# Patient Record
Sex: MALE | Race: BLACK OR AFRICAN AMERICAN
[De-identification: names, ages, dates, MRNs, and addresses within clinical notes are randomized per-mention and may not be internally consistent; named-entity substitution may affect disease eponyms.]

---

## 2017-07-28 NOTE — EMERGENCY ROOM REPORT
History of Present Illness


General


Chief Complaint:  Behavioral Complaint


Source:  Patient, Family Member, Caregiver





Present Illness


HPI


The patient is a 22-year-old male with history of depression and autism 

presenting for feelings of depression and thoughts of self-harm.  He is 

accompanied by his father.  The patient states that he would like to go to a 

psychiatric facility for help.  He states that he had thoughts of taking a 

knife to his neck.  He states he has regular followups with the psychiatrist 

monthly and he has been taking his medications as directed.


He denies taking any action to hurt himself.


He denies any other symptoms


Allergies:  


Coded Allergies:  


     AMOXICILLIN (Verified  Adverse Reaction, Intermediate, 12/7/16)


 Patient had whole body erythematous plaques, confluented in


 places. No airway or mucosal involvement.





Patient History


Past Medical History:  see triage record, psych hx


Pertinent Family History:  none


Reviewed Nursing Documentation:  PMH: Agreed, PSxH: Agreed





Nursing Documentation-PMH


Past Medical History:  No History, Except For


Hx Cardiac Problems:  No - AUTISM





Review of Systems


All Other Systems:  negative except mentioned in HPI





Physical Exam





Vital Signs








  Date Time  Temp Pulse Resp B/P Pulse Ox O2 Delivery O2 Flow Rate FiO2


 


7/28/17 15:18 97.9 80 14 138/74 98 Room Air  








Sp02 EP Interpretation:  reviewed, normal


General Appearance:  no apparent distress, alert, GCS 15, non-toxic


Head:  normocephalic, atraumatic


Eyes:  bilateral eye PERRL, bilateral eye normal inspection


ENT:  hearing grossly normal, normal pharynx, no angioedema, normal voice


Neck:  full range of motion, supple/symm/no masses, other - No lacerations or 

abrasions


Respiratory:  chest non-tender, lungs clear, normal breath sounds, speaking 

full sentences


Musculoskeletal:  back normal, gait/station normal, normal range of motion, non-

tender


Neurologic:  alert, oriented x3, responsive, sensory intact


Psychiatric:  normal inspection


Skin:  normal color, no rash, warm/dry, well hydrated





Medical Decision Making


PA Attestation


Dr. Pinedo is my supervising physician. Patient management was discussed with 

my supervising physician


Diagnostic Impression:  


 Primary Impression:  


 Depression


 Qualified Codes:  F32.9 - Major depressive disorder, single episode, 

unspecified


ER Course


The patient is a 22-year-old male with history of depression and autism 

presenting for feelings of depression and thoughts of self-harm





Differential diagnoses considered but not limited to suicidal ideation, 

homicidal ideation, depression, drug abuse





Physical exam: The patient is in no apparent distress.


There are no markings on the patient's neck.





I discussed with the patient that we are not a psychiatric facility but we 

would be able to medically clear him and possibly transfer him to one.





The father and the patient have decided to directly go to a psychiatric 

facility.





They were given ER precautions.





Last Vital Signs








  Date Time  Temp Pulse Resp B/P Pulse Ox O2 Delivery O2 Flow Rate FiO2


 


7/28/17 16:09 97.9 65 14 138/74 98 Room Air  








Status:  improved


Disposition:  HOME, SELF-CARE


Condition:  Stable


Referrals:  


NON PHYSICIAN (PCP)


Patient Instructions:  Depression, Adult





Additional Instructions:  


I discussed my findings with the patient and his father. All questions and 

concerns have been answered. 


The father and the patient have agreed to seek care at a psychiatric facility.


They have chosen to not stay and obtain medical clearance in the emergency 

department











JOSELINE RIVERA Jul 28, 2017 19:57

## 2017-08-29 NOTE — EMERGENCY ROOM REPORT
History of Present Illness


General


Chief Complaint:  General Complaint


Source:  Patient, Medical Record





Present Illness


HPI


This is a 22-year-old male with a history of his him.  He also has psychiatric 

history.  He snuck out of the house and came in not complaining that for 

himself but saying that he is concerned by his father's asthma.  He does not 

want us to call his father.  Patient denies suicidal thought homicidal thought.

  Denies any other complaint.  No hallucination.


Allergies:  


Coded Allergies:  


     AMOXICILLIN (Verified  Adverse Reaction, Intermediate, 12/7/16)


 Patient had whole body erythematous plaques, confluented in


 places. No airway or mucosal involvement.





Patient History


Past Medical History:  see triage record, old chart reviewed


Past Surgical History:  none


Pertinent Family History:  none


Social History:  Denies: smoking


Immunizations:  other


Reviewed Nursing Documentation:  PMH: Agreed, PSxH: Agreed





Nursing Documentation-PMH


Hx Cardiac Problems:  No - AUTISM





Review of Systems


Eye:  Denies: eye pain, blurred vision


ENT:  Denies: ear pain, nose congestion, throat swelling


Respiratory:  Denies: cough, shortness of breath


Cardiovascular:  Denies: chest pain, palpitations


Gastrointestinal:  Denies: abdominal pain, diarrhea, nausea, vomiting


Musculoskeletal:  Denies: back pain, joint pain


Skin:  Denies: rash


Neurological:  Denies: headache, numbness


Endocrine:  Denies: increased thirst, increased urine


Hematologic/Lymphatic:  Denies: easy bruising


All Other Systems:  negative except mentioned in HPI





Physical Exam





Vital Signs








  Date Time  Temp Pulse Resp B/P (MAP) Pulse Ox O2 Delivery O2 Flow Rate FiO2


 


8/29/17 02:45 98.1 85 13 160/84 96 Room Air  





vitals unremarkable


Sp02 EP Interpretation:  reviewed, normal


General Appearance:  well appearing, no apparent distress, alert


Head:  normocephalic, atraumatic


Eyes:  bilateral eye PERRL, bilateral eye EOMI


ENT:  hearing grossly normal, normal pharynx


Neck:  full range of motion, supple, no meningismus


Respiratory:  chest non-tender, lungs clear, normal breath sounds


Cardiovascular #1:  regular rate, rhythm, no murmur


Gastrointestinal:  normal bowel sounds, non tender, no mass, no organomegaly, 

no bruit, non-distended


Musculoskeletal:  back normal, gait/station normal, normal range of motion


Psychiatric:  mood/affect normal


Skin:  warm/dry





Medical Decision Making


Diagnostic Impression:  


 Primary Impression:  


 Behavior problem


ER Course


Patient presents with behavioral disturbance.  Probably from his autism and 

psychiatric issue.  He is at baseline.  His father's call and will be coming to 

pick patient up.  See no criteria for 5150.





Last Vital Signs








  Date Time  Temp Pulse Resp B/P (MAP) Pulse Ox O2 Delivery O2 Flow Rate FiO2


 


8/29/17 02:45 98.1 85 13 160/84 96 Room Air  








Status:  improved


Disposition:  HOME, SELF-CARE


Condition:  Stable





Additional Instructions:  


followup with your Dr. in 7 days.  Return it worse.











JUN LARSON M.D. Aug 29, 2017 03:19

## 2017-10-23 NOTE — EMERGENCY ROOM REPORT
History of Present Illness


General


Chief Complaint:  Behavioral Complaint


Source:  Patient





Present Illness


HPI


22-year-old male with history of autism and depression, presenting with 

depression.  Patient states that he has been compliant with his antidepression 

medication including Abilify, has an appointment to see a psychiatrist in 4 

days.  Patient states that he is poor at home and wants to get involved with 

the community so that he can get his thoughts away from his depression.  

Denying any current suicidal or homicidal thoughts.  Denying any auditory 

hallucinations.


Allergies:  


Coded Allergies:  


     AMOXICILLIN (Verified  Adverse Reaction, Intermediate, 12/7/16)


 Patient had whole body erythematous plaques, confluented in


 places. No airway or mucosal involvement.





Patient History


Past Medical History:  see triage record


Past Surgical History:  none


Pertinent Family History:  none


Reviewed Nursing Documentation:  PMH: Agreed, PSxH: Agreed





Nursing Documentation-PMH


Hx Cardiac Problems:  No - AUTISM


History Of Psychiatric Problem:  Yes - "Autism"; bipolar, depression





Review of Systems


All Other Systems:  negative except mentioned in HPI





Physical Exam





Vital Signs








  Date Time  Temp Pulse Resp B/P (MAP) Pulse Ox O2 Delivery O2 Flow Rate FiO2


 


10/23/17 07:50 97.5 94 16 126/77 95 Room Air  








Sp02 EP Interpretation:  reviewed, normal


General Appearance:  normal inspection, well appearing, no apparent distress, 

alert, GCS 15, non-toxic


Head:  normocephalic, atraumatic


Eyes:  bilateral eye normal inspection, bilateral eye PERRL, bilateral eye EOMI


ENT:  normal ENT inspection, normal pharynx, normal voice, moist mucus membranes


Neck:  normal inspection, full range of motion, supple


Respiratory:  normal inspection, lungs clear, normal breath sounds, no 

respiratory distress, no retraction, no wheezing, speaking full sentences, 

chest symmetrical


Cardiovascular #1:  normal inspection, regular rate, rhythm, no edema, normal 

capillary refill


Cardiovascular #2:  2+ radial (R), 2+ radial (L)


Gastrointestinal:  normal inspection, non tender, soft, non-distended, no 

guarding


Genitourinary:  no CVA tenderness


Musculoskeletal:  normal inspection, back normal, normal range of motion, non-

tender


Neurologic:  normal inspection, alert, oriented x3, responsive, motor strength/

tone normal, sensory intact, normal gait, speech normal


Psychiatric:  normal inspection, judgement/insight normal, memory normal, mood/

affect normal, no suicidal/homicidal ideation, no delusions


Skin:  normal inspection, normal color, no rash, warm/dry, well hydrated, 

normal turgor





Medical Decision Making


Diagnostic Impression:  


 Primary Impression:  


 Depression


ER Course


22-year-old male with depression


No active SI or HI





Plan:


None in the emergency room





ER course:


Patient has remained stable during ED stay.


Patient was happy affect, very conversive, showing myself and staff his 

projects that he has been working on, ambulatory emergency room, again no SI or 

HI





Disposition:


Patient is to be discharged to home.


Patient is instructed to follow up with their psychiatrist  within 5 days. 





Strict return precautions discussed with patient such as suicidal or homicidal 

thoughts, auditory hallucinations. 





Please note that this Emergency Department Report was dictated using [employer] technology software, occasionally this can lead to 

erroneous entry secondary to interpretation by the dictation equipment





Last Vital Signs








  Date Time  Temp Pulse Resp B/P (MAP) Pulse Ox O2 Delivery O2 Flow Rate FiO2


 


10/23/17 08:35  78 16 118/78 100 Room Air  


 


10/23/17 08:35 97.5       








Disposition:  HOME, SELF-CARE


Condition:  Stable


Referrals:  


NOT CHOSEN [facility],REFERRING (PCP)


Patient Instructions:  Depression, Adult, Easy-to-Read





Additional Instructions:  


Please followup with your psychiatrist in 4 days without fail





Please return to the emergency room if you are experiencing suicidal thoughts, 

hearing voices,











Keyonna Godoy M.D. Oct 23, 2017 10:02

## 2017-12-18 NOTE — EMERGENCY ROOM REPORT
History of Present Illness


General


Chief Complaint:  General Complaint


Source:  Patient, Medical Record





Present Illness


HPI


22-year-old male presents to the emergency department complaining of 

exacerbation of his depression x4 months.  Patient presents with his guardian 

he states that he has been taking Lexapro for his symptoms of depression and 

intermittent anxiety.  Patient states that over the last few weeks he has had 

increase in anhedonia, appetite, overwhelming feeling of sadness he denies SI 

or HI.  Patient reports difficulty concentrating and increased anxiety and 

social situations.  Father tested the states patient has become increasingly 

withdrawn, especially when attempting to verbalize his feelings and is having 

exacerbations of his autism.  Father states that patient was high functioning 

autistic to hold a job and finish schooling.  He noticed that his symptoms 

however increased after his friends moved away.  Denies fevers, chills, cough, 

recent trauma.  he denies history of TBI.  Denies flashbacks or witnessing her 

segments.  he states that he self DC'd his Lexapro 2 weeks ago, ordered some 

mild headaches after abrupt discontinuance.  Patient has been taking this 

medication since childhood.


Allergies:  


Coded Allergies:  


     AMOXICILLIN (Verified  Adverse Reaction, Intermediate, 12/7/16)


 Patient had whole body erythematous plaques, confluented in


 places. No airway or mucosal involvement.





Patient History


Past Medical History:  see triage record


Past Surgical History:  none


Pertinent Family History:  none


Reviewed Nursing Documentation:  PMH: Agreed, PSxH: Agreed





Nursing Documentation-PMH


Past Medical History:  No History, Except For


Hx Cardiac Problems:  No - AUTISM





Physical Exam





Vital Signs








  Date Time  Temp Pulse Resp B/P (MAP) Pulse Ox O2 Delivery O2 Flow Rate FiO2


 


12/18/17 12:06 97.5 80 18 149/88 99 Room Air  











Medical Decision Making


PA Attestation


Dr. andres is my supervising Physician whom patient management has been 

discussed with.


Diagnostic Impression:  


 Primary Impression:  


 Depression


 Qualified Codes:  F33.1 - Major depressive disorder, recurrent, moderate


 Additional Impressions:  


 Impaired attention


 Decreased attention Span


ER Course








Pt. presents to the ED c/o  [ ] 





Pt is hyperactive, and has a very  anxious and restless affect.  





Ddx considered but are not limited to OD, SI/HI, psychosis, UTI, intoxication





Vital signs: are WNL, pt. is afebrile


H&PE are most consistent with behavioral/mental health issue





ORDERS:








ED INTERVENTIONS: 


- 





-Telephone Psych Consult: Dr. Nolen


 *** Consulted with  for heart he regarding starting patient on Wellbutrin 

for depression and social withdrawal.  Discussed how this patient is a high 

functioning autistic person with intermittent anxiety however both patient and 

father described hyperactivity and anxiety symptoms may be stemming from an 

adequately managed hyperactivity disorder which is typically associated with 

autistic persons.  Has no history of aggression, aside or PSA's and therefore 

does not have any contraindications for starting this medication. 








Discussed with the patient and his guardian that this medication should be 

titrated on slowly as it may increase his anxiety early and that he will be 

started on the lowest dose he needs to followup with psychiatric professional 

within 3-5 days he is also going to be given next is mental health urgent care 

if he is unable to contact his psychiatrist.  The ED return precautions.   

Discussed with patient that if he develops aggression or as I that he needs to 

immediately discontinue this medication. 





DISPOSITION: Pt stable for close outpatient followup will start trial of 

Wellbutrin SR. 





Pt to follow up with [facility] IF UNABLE TO FOLLOW UP 

WITH HIS NEW PSYCH.  IN A TIMELY FASHION.





Last Vital Signs








  Date Time  Temp Pulse Resp B/P (MAP) Pulse Ox O2 Delivery O2 Flow Rate FiO2


 


12/18/17 12:06 97.5 80 18 149/88 99 Room Air  








Disposition:  HOME, SELF-CARE


Condition:  Stable


Physician Consult:  Dr. Nolen ( telephone consult)


Scripts


Bupropion Hcl* (BUPROPION HCL*) 75 Mg Tablet


75 MG PO BID for Per rx protocol for 15 Days, #30 TAB


   Start by Taking one tablet ONCE daily for 4 days then increase to


   TWICE daily - Follow up with Psych for refills or dosage change.


   Prov: Ping Whitman         12/18/17


Referrals:  


[facility],REFERRING (PCP)


Patient Instructions:  Bupropion tablets (Depression/Mood Disorders)





Additional Instructions:  


Take medications as directed. 





Followup with psychiatrist regarding new medication  Wellbutrin in addition to 

evaluation for your symptoms and further medication management. -- REVIEW 

[facility] RESOURCE INFORMATION FOR FOLLOW UP IF NEEDED. 





*!* Discontinue medication immediately if you experience increased aggression, 

suicidal thoughts or worsening of your symptoms. 





 ** Follow up with a Primary Care Provider in 3-5 days, even if your symptoms 

have resolved. ** - Recommend Thyroid function testing and vitamin D levels if 

this has not been performed recently. 





--Please review list of primary care clinics, if you do not already have a 

primary care provider





Return sooner to ED if new symptoms occur, or current symptoms become worse. 











- Please note that this Emergency Department Report was dictated using [employer] technology software, occasionally this can lead to 

erroneous entry secondary to interpretation by the dictation equipment.











Ping Whitman Dec 18, 2017 12:45

## 2019-12-12 ENCOUNTER — HOSPITAL ENCOUNTER (EMERGENCY)
Dept: HOSPITAL 72 - EMR | Age: 24
Discharge: HOME | End: 2019-12-12
Payer: MEDICAID

## 2019-12-12 VITALS — SYSTOLIC BLOOD PRESSURE: 120 MMHG | DIASTOLIC BLOOD PRESSURE: 80 MMHG

## 2019-12-12 VITALS — SYSTOLIC BLOOD PRESSURE: 147 MMHG | DIASTOLIC BLOOD PRESSURE: 89 MMHG

## 2019-12-12 VITALS — WEIGHT: 170 LBS | BODY MASS INDEX: 21.14 KG/M2 | HEIGHT: 75 IN

## 2019-12-12 DIAGNOSIS — S52.572A: ICD-10-CM
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
## 2019-12-12 DIAGNOSIS — F90.9: ICD-10-CM

## 2019-12-12 DIAGNOSIS — F91.9: Primary | ICD-10-CM

## 2019-12-12 DIAGNOSIS — R45.851: ICD-10-CM

## 2019-12-12 DIAGNOSIS — F84.0: ICD-10-CM
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
## 2019-12-12 LAB
ADD MANUAL DIFF: NO
ALBUMIN SERPL-MCNC: 3.6 G/DL (ref 3.4–5)
ALBUMIN/GLOB SERPL: 0.9 {RATIO} (ref 1–2.7)
ALP SERPL-CCNC: 65 U/L (ref 46–116)
ALT SERPL-CCNC: 32 U/L (ref 12–78)
ANION GAP SERPL CALC-SCNC: 7 MMOL/L (ref 5–15)
AST SERPL-CCNC: 28 U/L (ref 15–37)
BASOPHILS NFR BLD AUTO: 1.1 % (ref 0–2)
BILIRUB SERPL-MCNC: 0.4 MG/DL (ref 0.2–1)
BUN SERPL-MCNC: 11 MG/DL (ref 7–18)
CALCIUM SERPL-MCNC: 8.9 MG/DL (ref 8.5–10.1)
CHLORIDE SERPL-SCNC: 105 MMOL/L (ref 98–107)
CO2 SERPL-SCNC: 30 MMOL/L (ref 21–32)
CREAT SERPL-MCNC: 0.9 MG/DL (ref 0.55–1.3)
EOSINOPHIL NFR BLD AUTO: 2.6 % (ref 0–3)
ERYTHROCYTE [DISTWIDTH] IN BLOOD BY AUTOMATED COUNT: 10.6 % (ref 11.6–14.8)
GLOBULIN SER-MCNC: 3.8 G/DL
HCT VFR BLD CALC: 42 % (ref 42–52)
HGB BLD-MCNC: 14.4 G/DL (ref 14.2–18)
LYMPHOCYTES NFR BLD AUTO: 13.1 % (ref 20–45)
MCV RBC AUTO: 79 FL (ref 80–99)
MONOCYTES NFR BLD AUTO: 5.3 % (ref 1–10)
NEUTROPHILS NFR BLD AUTO: 78 % (ref 45–75)
PLATELET # BLD: 265 K/UL (ref 150–450)
POTASSIUM SERPL-SCNC: 3.8 MMOL/L (ref 3.5–5.1)
RBC # BLD AUTO: 5.35 M/UL (ref 4.7–6.1)
SODIUM SERPL-SCNC: 141 MMOL/L (ref 136–145)
WBC # BLD AUTO: 7.2 K/UL (ref 4.8–10.8)

## 2019-12-12 PROCEDURE — 99284 EMERGENCY DEPT VISIT MOD MDM: CPT

## 2019-12-12 PROCEDURE — 73090 X-RAY EXAM OF FOREARM: CPT

## 2019-12-12 PROCEDURE — 36415 COLL VENOUS BLD VENIPUNCTURE: CPT

## 2019-12-12 PROCEDURE — 85025 COMPLETE CBC W/AUTO DIFF WBC: CPT

## 2019-12-12 PROCEDURE — 80307 DRUG TEST PRSMV CHEM ANLYZR: CPT

## 2019-12-12 PROCEDURE — 80053 COMPREHEN METABOLIC PANEL: CPT
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
## 2019-12-13 NOTE — DIAGNOSTIC IMAGING REPORT
Indications: Pain, trauma, status post fall one week ago

 

Technique: Two views of the  left  forearm

 

Comparison: None

 

Findings: There is a comminuted intra-articular distal radius fracture, not optimally

demonstrated. No definite ulnar fracture.

 

Impression: Positive for distal radial fracture. This is not optimally demonstrated,

and dedicated wrist radiographs should be considered to better characterize

 

This agrees with the preliminary interpretation reported by the emergency room

physician in the electronic medical record

## 2020-03-01 ENCOUNTER — HOSPITAL ENCOUNTER (EMERGENCY)
Dept: HOSPITAL 72 - EMR | Age: 25
Discharge: HOME | End: 2020-03-01
Payer: MEDICAID

## 2020-03-01 ENCOUNTER — HOSPITAL ENCOUNTER (EMERGENCY)
Dept: HOSPITAL 87 - ER | Age: 25
LOS: 1 days | Discharge: HOME | End: 2020-03-02
Payer: SELF-PAY

## 2020-03-01 VITALS — BODY MASS INDEX: 18.27 KG/M2 | WEIGHT: 150 LBS | HEIGHT: 76 IN

## 2020-03-01 VITALS — SYSTOLIC BLOOD PRESSURE: 120 MMHG | DIASTOLIC BLOOD PRESSURE: 87 MMHG

## 2020-03-01 VITALS — WEIGHT: 165.35 LBS | HEIGHT: 70 IN | BODY MASS INDEX: 23.67 KG/M2
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
## 2020-03-01 DIAGNOSIS — S62.102A: Primary | ICD-10-CM

## 2020-03-01 DIAGNOSIS — Z88.1: ICD-10-CM

## 2020-03-01 DIAGNOSIS — S50.312A: Primary | ICD-10-CM
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
## 2020-03-01 DIAGNOSIS — F84.0: ICD-10-CM

## 2020-03-01 DIAGNOSIS — S29.9XXA: ICD-10-CM
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
## 2020-03-01 PROCEDURE — 99284 EMERGENCY DEPT VISIT MOD MDM: CPT

## 2020-03-01 PROCEDURE — 71045 X-RAY EXAM CHEST 1 VIEW: CPT

## 2020-03-01 PROCEDURE — 29125 APPL SHORT ARM SPLINT STATIC: CPT

## 2020-03-01 PROCEDURE — 90715 TDAP VACCINE 7 YRS/> IM: CPT

## 2020-03-01 PROCEDURE — 73110 X-RAY EXAM OF WRIST: CPT

## 2020-03-01 PROCEDURE — 90471 IMMUNIZATION ADMIN: CPT

## 2020-03-01 PROCEDURE — 99282 EMERGENCY DEPT VISIT SF MDM: CPT
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
## 2020-03-01 NOTE — NUR
ED Nurse Note:



Pt cleared by ERMD for discharge.  DC instructions was given and explained to 
pt and verbalized understanding of teachings. All medical deviecs such as ID 
band  removed. Pt is AAO x4, ambulatory and left with all personal belongings. 
Pt accompanied by a family member.
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
## 2020-03-02 VITALS — SYSTOLIC BLOOD PRESSURE: 124 MMHG | DIASTOLIC BLOOD PRESSURE: 78 MMHG
